# Patient Record
Sex: MALE | Race: OTHER | Employment: UNEMPLOYED | URBAN - METROPOLITAN AREA
[De-identification: names, ages, dates, MRNs, and addresses within clinical notes are randomized per-mention and may not be internally consistent; named-entity substitution may affect disease eponyms.]

---

## 2022-06-28 ENCOUNTER — HOSPITAL ENCOUNTER (EMERGENCY)
Age: 37
Discharge: HOME | End: 2022-06-28
Attending: STUDENT IN AN ORGANIZED HEALTH CARE EDUCATION/TRAINING PROGRAM
Payer: MEDICAID

## 2022-06-28 ENCOUNTER — APPOINTMENT (OUTPATIENT)
Dept: GENERAL RADIOLOGY | Age: 37
End: 2022-06-28
Attending: EMERGENCY MEDICINE
Payer: MEDICAID

## 2022-06-28 VITALS
HEART RATE: 73 BPM | OXYGEN SATURATION: 100 % | BODY MASS INDEX: 30.1 KG/M2 | HEIGHT: 71 IN | WEIGHT: 215 LBS | TEMPERATURE: 98 F | SYSTOLIC BLOOD PRESSURE: 132 MMHG | DIASTOLIC BLOOD PRESSURE: 74 MMHG | RESPIRATION RATE: 18 BRPM
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
## 2022-06-28 DIAGNOSIS — T14.8XXA PUNCTURE WOUND: ICD-10-CM

## 2022-06-28 PROCEDURE — 99283 EMERGENCY DEPT VISIT LOW MDM: CPT

## 2022-06-28 PROCEDURE — 74011250637 HC RX REV CODE- 250/637: Performed by: STUDENT IN AN ORGANIZED HEALTH CARE EDUCATION/TRAINING PROGRAM

## 2022-06-28 PROCEDURE — 73140 X-RAY EXAM OF FINGER(S): CPT

## 2022-06-28 RX ORDER — IBUPROFEN 400 MG/1
800 TABLET ORAL ONCE
Status: COMPLETED | OUTPATIENT
Start: 2022-06-28 | End: 2022-06-28

## 2022-06-28 RX ORDER — CEPHALEXIN 500 MG/1
500 CAPSULE ORAL 2 TIMES DAILY
Qty: 14 CAPSULE | Refills: 0 | Status: SHIPPED | OUTPATIENT
Start: 2022-06-28 | End: 2022-07-05

## 2022-06-28 RX ADMIN — IBUPROFEN 800 MG: 400 TABLET, FILM COATED ORAL at 18:56

## 2022-06-28 NOTE — ED PROVIDER NOTES
EMERGENCY DEPARTMENT HISTORY AND PHYSICAL EXAM    6:22 PM    Date: 6/28/2022  Patient Name: Laurita Arango    History of Presenting Illness     Chief Complaint   Patient presents with    Finger Pain     drilled through finger; left index       History Provided By: Patient  Location/Duration/Severity/Modifying factors   HPI  Laurita Arango is a 40 y.o. male with no past medical problems presenting for evaluation of left index finger pain. He says just prior to arrival he was using a electric drill that slipped, and he ran the drill through his finger. He complains of pain at the drill site, and some minor bleeding that stopped with pressure. He does not have any distal numbness or tingling. He did not take anything for the pain, overall it is mild, nothing makes it better or worse. His tetanus is up-to-date, last received about 1 year ago. Denies any other injury today. No other medical complaints. PCP: None    Current Facility-Administered Medications   Medication Dose Route Frequency Provider Last Rate Last Admin    ibuprofen (MOTRIN) tablet 800 mg  800 mg Oral ONCE Miya Barr MD         Current Outpatient Medications   Medication Sig Dispense Refill    cephALEXin (Keflex) 500 mg capsule Take 1 Capsule by mouth two (2) times a day for 7 days. 14 Capsule 0       Past History     Past Medical History:  History reviewed. No pertinent past medical history. Past Surgical History:  History reviewed. No pertinent surgical history. Family History:  History reviewed. No pertinent family history. Social History:  Social History     Tobacco Use    Smoking status: Never Smoker    Smokeless tobacco: Never Used   Vaping Use    Vaping Use: Never used   Substance Use Topics    Alcohol use: Not Currently    Drug use: Never       Allergies: Allergies   Allergen Reactions    Pantoprazole Angioedema       I reviewed and confirmed the above information with patient and updated as necessary.     Review of Systems     Review of Systems   Constitutional: Negative for diaphoresis and fever. HENT: Negative for ear pain and sore throat. Eyes:        No acute change in vision   Respiratory: Negative for cough and shortness of breath. Cardiovascular: Negative for chest pain and leg swelling. Gastrointestinal: Negative for abdominal pain and vomiting. Genitourinary: Negative for dysuria. Musculoskeletal: Negative for neck pain. Skin: Positive for wound. Neurological: Negative for weakness and headaches. Physical Exam     Visit Vitals  /74 (BP 1 Location: Left upper arm, BP Patient Position: Sitting)   Pulse 73   Temp 98 °F (36.7 °C)   Resp 18   Ht 5' 11\" (1.803 m)   Wt 97.5 kg (215 lb)   SpO2 100%   BMI 29.99 kg/m²       Physical Exam  Vitals and nursing note reviewed. Constitutional:       Appearance: Normal appearance. He is not ill-appearing. Comments: Pleasant adult male resting comfortably, nondistressed   HENT:      Mouth/Throat:      Mouth: Mucous membranes are moist.   Eyes:      Pupils: Pupils are equal, round, and reactive to light. Cardiovascular:      Rate and Rhythm: Normal rate and regular rhythm. Pulses: Normal pulses. Heart sounds: Normal heart sounds. Pulmonary:      Effort: Pulmonary effort is normal.      Breath sounds: Normal breath sounds. Abdominal:      General: Abdomen is flat. Tenderness: There is no abdominal tenderness. Musculoskeletal:         General: Tenderness present. No swelling. Normal range of motion. Cervical back: Normal range of motion. Comments: Strength, flexion and extension preserved in injured finger   Skin:     General: Skin is warm. Capillary Refill: Capillary refill takes 2 to 3 seconds. Comments: 2mm circular puncture wound on medial left index finger   Neurological:      General: No focal deficit present. Mental Status: He is alert and oriented to person, place, and time.          Diagnostic Study Results     Labs -  No results found for this or any previous visit (from the past 24 hour(s)). Radiologic Studies -   XR FINGERS LT 2 OR MORE    (Results Pending)           Medical Decision Making   I am the first provider for this patient. I reviewed the vital signs, available nursing notes, past medical history, past surgical history, family history and social history. Vital Signs-Reviewed the patient's vital signs. Records Reviewed: Nursing Notes and Old Medical Records (Time of Review: 6:22 PM)    Provider Notes (Medical Decision Making):   MDM  60-year-old male here after an injury using a drill consider laceration, abrasion, bony injury, fracture, others    ED Course: Progress Notes, Reevaluation, and Consults:  Patient afebrile and hemodynamically normal  Exam discussed above    Will obtain x-ray, treat with ibuprofen, washout and clean. \    X-ray negative by my read, the wound was extensively washed out by this provider. Patient is appropriate for discharge home. Discussed return precautions. We will send in a prescription for Keflex. Signs and symptoms prompting return to the ED were discussed. Procedures  Diagnosis     Clinical Impression:   1. Contact with powered drill as cause of accidental injury    2. Puncture wound        Disposition: Home    Follow-up Information     Follow up With Specialties Details Why Contact Info    [address] [facility] EMERGENCY DEPT Emergency Medicine  As needed, If symptoms worsen [address] [facility]  [phone]           Patient's Medications   Start Taking    CEPHALEXIN (KEFLEX) 500 MG CAPSULE    Take 1 Capsule by mouth two (2) times a day for 7 days.    Continue Taking    No medications on file   These Medications have changed    No medications on file   Stop Taking    No medications on file       Christiano Choudhury MD   Emergency Medicine   June 28, 2022, 6:22 PM     This note is dictated utilizing Dragon voice recognition software. Unfortunately this leads to occasional typographical errors using the voice recognition. I apologize in advance if the situation occurs. If questions occur please do not hesitate to contact me directly.     Yadira Burgos MD

## 2022-06-28 NOTE — ED TRIAGE NOTES
Pt was drilling a cabinet and drilled into left index finger. Bleeding contained with band aid. Pt reports he thinks tetnaus up to date.

## 2022-06-28 NOTE — Clinical Note
[address] [facility] EMERGENCY DEPT  [id number] [address]  [phone]    Work/School Note    Date: 6/28/2022    To Whom It May concern:      Shyanne Aly was seen and treated today in the emergency room by the following provider(s):  Attending Provider: Richard Conley MD.      Shyanne Aly is excused from work/school on 06/28/22. He is clear to return to work/school on 06/29/22.         Sincerely,          Keith Grullon MD

## 2022-06-28 NOTE — Clinical Note
[address] [facility] EMERGENCY DEPT  [id number] [id number] [phone]    Work/School Note    Date: 6/28/2022    To Whom It May concern:    Parris Luna was seen and treated today in the emergency room by the following provider(s):  Attending Provider: Tg Dao MD.      Parris Luna is excused from work/school on 06/28/22 and 06/29/22. He is medically clear to return to work/school on 6/30/2022.        Sincerely,          Anika Soto MD

## 2022-06-28 NOTE — DISCHARGE INSTRUCTIONS
Please apply Neosporin or bacitracin to your wound, change the bandage daily. Return to the emergency department if develops redness, drainage or other concerning signs of infection.